# Patient Record
Sex: MALE | URBAN - NONMETROPOLITAN AREA
[De-identification: names, ages, dates, MRNs, and addresses within clinical notes are randomized per-mention and may not be internally consistent; named-entity substitution may affect disease eponyms.]

---

## 2017-08-01 ENCOUNTER — APPOINTMENT (RX ONLY)
Dept: URBAN - NONMETROPOLITAN AREA CLINIC 25 | Facility: CLINIC | Age: 27
Setting detail: DERMATOLOGY
End: 2017-08-01

## 2017-08-01 DIAGNOSIS — L40.4 GUTTATE PSORIASIS: ICD-10-CM

## 2017-08-01 PROBLEM — L40.0 PSORIASIS VULGARIS: Status: ACTIVE | Noted: 2017-08-01

## 2017-08-01 PROCEDURE — ? PRESCRIPTION

## 2017-08-01 PROCEDURE — ? KOH PREP

## 2017-08-01 PROCEDURE — ? COUNSELING

## 2017-08-01 PROCEDURE — 99202 OFFICE O/P NEW SF 15 MIN: CPT

## 2017-08-01 PROCEDURE — ? PATIENT SPECIFIC COUNSELING

## 2017-08-01 RX ORDER — HYDROXYZINE HYDROCHLORIDE 50 MG/1
TABLET, FILM COATED ORAL
Qty: 30 | Refills: 1 | Status: ERX | COMMUNITY
Start: 2017-08-01

## 2017-08-01 RX ORDER — FLUOCINOLONE ACETONIDE 0.11 MG/ML
OIL TOPICAL
Qty: 1 | Refills: 4 | Status: ERX | COMMUNITY
Start: 2017-08-01

## 2017-08-01 RX ADMIN — HYDROXYZINE HYDROCHLORIDE: 50 TABLET, FILM COATED ORAL at 18:38

## 2017-08-01 RX ADMIN — FLUOCINOLONE ACETONIDE: 0.11 OIL TOPICAL at 18:36

## 2017-08-01 ASSESSMENT — LOCATION DETAILED DESCRIPTION DERM: LOCATION DETAILED: LEFT ANTERIOR SHOULDER

## 2017-08-01 ASSESSMENT — LOCATION ZONE DERM: LOCATION ZONE: ARM

## 2017-08-01 ASSESSMENT — LOCATION SIMPLE DESCRIPTION DERM: LOCATION SIMPLE: LEFT SHOULDER

## 2017-08-01 NOTE — PROCEDURE: MIPS QUALITY
Detail Level: Detailed
Quality 226: Preventive Care And Screening: Tobacco Use: Screening And Cessation Intervention: Patient screened for tobacco and is a smoker AND received Cessation Counseling
Quality 130: Documentation Of Current Medications In The Medical Record: Current Medications Documented
Quality 431: Preventive Care And Screening: Unhealthy Alcohol Use - Screening: Patient screened for unhealthy alcohol use using a single question and scores 2 or greater episodes per year and brief intervention occurred
Quality 265: Biopsy Follow-Up: Biopsy results reviewed, communicated, tracked, and documented
Quality 110: Preventive Care And Screening: Influenza Immunization: Influenza Immunization not Administered for Documented Reasons.
Quality 128: Preventive Care And Screening: Body Mass Index (Bmi) Screening And Follow-Up Plan: BMI is documented within normal parameters and no follow-up plan is required.

## 2017-08-01 NOTE — HPI: RASH
How Severe Is Your Rash?: severe
Is This A New Presentation, Or A Follow-Up?: Rash
Additional History: Started prednisone taper 7 days ago. Started amoxicillin for positive strep culture last Saturday. First time he has had these symptoms.

## 2017-08-01 NOTE — PROCEDURE: PATIENT SPECIFIC COUNSELING
Detail Level: Generalized
I appreciate the input from Betsey Live MD who reviewed photos and the assessment and plan with me.
Detail Level: Detailed
Recommended sunlight for short duration until skin starts to become pink, but not burn.\\n\\nIf not improving in two weeks, we'll order labs and consider different tx (mtx vs cyclosporine). Counseled patient to reduce alcohol consumption while using RXs.

## 2017-08-01 NOTE — PROCEDURE: KOH PREP
Detail Level: Generalized
Showing: no pathologic findings
Koh Intro Text (From The.....): A KOH prep was ordered and evaluated from the